# Patient Record
Sex: FEMALE | Race: WHITE | NOT HISPANIC OR LATINO | Employment: FULL TIME | URBAN - METROPOLITAN AREA
[De-identification: names, ages, dates, MRNs, and addresses within clinical notes are randomized per-mention and may not be internally consistent; named-entity substitution may affect disease eponyms.]

---

## 2019-07-19 ENCOUNTER — NON-PROVIDER VISIT (OUTPATIENT)
Dept: HEALTH INFORMATION MANAGEMENT | Facility: MEDICAL CENTER | Age: 39
End: 2019-07-19
Payer: MEDICAID

## 2019-07-19 VITALS — BODY MASS INDEX: 35.22 KG/M2 | HEIGHT: 63 IN | WEIGHT: 198.8 LBS

## 2019-07-19 DIAGNOSIS — O24.419 GESTATIONAL DIABETES MELLITUS (GDM) IN SECOND TRIMESTER, GESTATIONAL DIABETES METHOD OF CONTROL UNSPECIFIED: ICD-10-CM

## 2019-07-19 PROCEDURE — G0109 DIAB MANAGE TRN IND/GROUP: HCPCS | Performed by: INTERNAL MEDICINE

## 2019-07-19 NOTE — PROGRESS NOTES
Destiny received a 1800 calorie meal plan (based on 20 kcal/kg of current weight) consisting of 3 meals and 3 snacks (see media for copy of meal plan).   Pt's prepregnancy weight was: 196lb. She has gained 2.8lb so far in this pregnancy.   Recommended meal times:   B: 8am   S:  11am   L: 1:30pm   S:  3:30pm   D: 6:30pm   S: 10pm    Pt was educated on carbohydrate containing foods vs non carbohydrate containing foods, the importance of small frequent meals, limiting carbohydrate to 1 serving in the morning, no fruit before noon/12pm, and avoiding all concentrated sweets for the remainder of her pregnancy. Explained the importance of not going >4hrs btwn eating during the day and no longer than 10hours overnight. Patient agrees to follow the meal plan and guidelines provided.

## 2019-07-19 NOTE — LETTER
July 19, 2019                   Re: Destiny Layton     1980         [id number]       Debby Harris M.D.  [address] [place] [address]      Dear :Debby Harris M.D.    On 7/19/2019, your patient Destiny Layton, received 1 hours of nutrition training from the [facility] at [facility] for management of her gestational diabetes.  Her EDC is Estimated Date of Delivery: 12/15/19.  We taught the following subjects:    Patient was provided with a 1800 calorie meal plan with 3 meals and 3 snacks.  Meal plan contains 180 grams of carbohydrates per day.   Importance of meal planning in diabetes management during pregnancy  Importance of consistent timing of meals and snacks and agreed upon times  Avoidance of simple carbohydrates  Metabolism of food components relating to pregnancy  Identification of foods in food groups  Patient demonstrates adequate ability to utilize meal planning manual for reference  Plan 3 meals and 3 snacks with 90% accuracy  Review basic principles of eating out  Reviewed precautions with artificial sweeteners    Comments:  Destiny agreed to follow the meal plan and to eat at the times agreed upon.  She will check blood glucose 4 times a day and record the values in her log book.      Destiny Layton was encouraged to discuss this further with you.    Hopefully this will help in your management of her care.  If we can be of further assistance, please feel free to call.    Thank you for the referral.    Sincerely,  Carmen Peters, RD, LD, CDE  [phone]

## 2019-07-19 NOTE — LETTER
July 19, 2019                   Re: Destiny Layton     1980         [id number]       Debby Harris M.D.  [address] [place] [address]      Dear :Debby Harris M.D.    On 7/19/2019, your patient Destiny Layton, received 1 hour of diabetes education from the [facility] at [facility] for management of her gestational diabetes.  Her EDC is : 12/15/19.  We taught the following subjects:    Introduction to gestational diabetes, benefits and responsibilities of patient, physiology of diabetes and the diease process, benefits of blood glucose monitoring and record keeping, medication action and possible side effects, hypoglycemia, sick day management, exercise, stress reduction and travel with diabetes.       Nurse assessment / Education:    Comments:    EdemaNo    Weight:Weight: 90.2 kg (198 lb 12.8 oz)         Complaints:No      Pathophysiology of diabetes in pregnancy    Discuss  potential maternal and fetal complications in pregnancy with diabetes.     Importance of blood glucose monitoring   Proper testing technique using a One Touch Ultra Mini meter.    At 1415, the meter read 77, which was 2 hours after eating.  Testing: fasting and one hour after meals,  expected ranges and rationale for strict control.   Urine ketone testing and rationale    Ketone testing:  Per Dr. Harris.    Ketone test today:Nolycemia.     Insulin taught: No  Insulin briefly dicussed at this time.    Should patient require insulin later in pregnancy, she would need further education.     Reviewed fetal kick counts and other tests to determine fetal well-being  Discuss benefits and risks of exercise in pregnancy  Discuss when to call Doctor  Discuss sick day care  Importance of wearing diabetes identification    Destiny attended Gestational Diabetes class with her SO. Pt brought in her One touch ultra mini and was taught /reviewed finger sticks. Return demo done with finger stick of 77, 2 hours pp. Pt has a hx of  gestational diabetes. Discussed what she needs to do after delivery for herself and family to limit risk for type two diabetes.    Patient/caregiver appeared to understand the content as demonstrated by appropriate questions.     Destiny Layton was encouraged to discuss this further with you.    Hopefully this will help in your management of her care.  If we can be of further assistance, please feel free to call.    Thank you for the referral.    Sincerely,    Faby Vides RN CDE  Certified Diabetes Educator

## 2019-07-19 NOTE — PROGRESS NOTES
Destiny attended Gestational Diabetes class with her SO. Pt brought in her One touch ultra mini and was taught /reviewed finger sticks. Return demo done with finger stick of 77, 2 hours pp. Pt has a hx of gestational diabetes. Discussed what she needs to do after delivery for herself and family to limit risk for type two diabetes.